# Patient Record
Sex: FEMALE | Race: WHITE | NOT HISPANIC OR LATINO | URBAN - METROPOLITAN AREA
[De-identification: names, ages, dates, MRNs, and addresses within clinical notes are randomized per-mention and may not be internally consistent; named-entity substitution may affect disease eponyms.]

---

## 2024-09-09 ENCOUNTER — EMERGENCY (EMERGENCY)
Facility: HOSPITAL | Age: 44
LOS: 1 days | Discharge: HOME | End: 2024-09-09
Admitting: EMERGENCY MEDICINE
Payer: COMMERCIAL

## 2024-09-09 VITALS
HEIGHT: 64 IN | WEIGHT: 177.03 LBS | OXYGEN SATURATION: 97 % | DIASTOLIC BLOOD PRESSURE: 68 MMHG | RESPIRATION RATE: 16 BRPM | TEMPERATURE: 98 F | SYSTOLIC BLOOD PRESSURE: 102 MMHG | HEART RATE: 69 BPM

## 2024-09-09 PROCEDURE — 99284 EMERGENCY DEPT VISIT MOD MDM: CPT

## 2024-09-09 RX ORDER — RABIES IMMUNE GLOBULIN (HUMAN) 300 [IU]/ML
1600 INJECTION, SOLUTION INFILTRATION; INTRAMUSCULAR ONCE
Refills: 0 | Status: COMPLETED | OUTPATIENT
Start: 2024-09-09 | End: 2024-09-09

## 2024-09-09 RX ORDER — RABIES VIRUS STRAIN PM-1503-3M ANTIGEN (PROPIOLACTONE INACTIVATED) AND WATER 2.5 UNIT
1 KIT INTRAMUSCULAR ONCE
Refills: 0 | Status: COMPLETED | OUTPATIENT
Start: 2024-09-09 | End: 2024-09-09

## 2024-09-09 RX ADMIN — RABIES IMMUNE GLOBULIN (HUMAN) 1600 UNIT(S): 300 INJECTION, SOLUTION INFILTRATION; INTRAMUSCULAR at 17:00

## 2024-09-09 RX ADMIN — RABIES VIRUS STRAIN PM-1503-3M ANTIGEN (PROPIOLACTONE INACTIVATED) AND WATER 1 MILLILITER(S): KIT at 17:00

## 2024-09-09 NOTE — ED PROVIDER NOTE - PATIENT PORTAL LINK FT
You can access the FollowMyHealth Patient Portal offered by [facility] by registering at the following website: [email or web address]. By joining [employer]’s FollowMyHealth portal, you will also be able to view your health information using other applications (apps) compatible with our system.

## 2024-09-09 NOTE — ED PROVIDER NOTE - NSFOLLOWUPINSTRUCTIONS_ED_ALL_ED_FT
DOSES  DAY 0 (TODAY)  DAY 3 (9/12)  DAY 7 (9/16)  DAY 14 (9/23)    1. What is rabies?    Rabies is a serious disease. It is caused by a virus.  Rabies is mainly a disease of animals. Humans get rabies when they are bitten by infected animals.  At first there might not be any symptoms. But weeks, or even months after a bite, rabies can cause pain, fatigue, headaches, fever, and irritability. These are followed by seizures, hallucinations, and paralysis. Human rabies is almost always fatal.  Wild animals—especially bats—are the most common source of human rabies infection in the [place].   Skunks, raccoons, dogs, cats, coyotes, foxes and other mammals can also transmit the disease.  Human rabies is rare in the [place]. There have been only 55 cases diagnosed since 1990.   However, between 16,000 and 39,000 people are vaccinated each year as a precaution after animal bites. Also, rabies is far more common in other parts of the world, with about 40,000–70,000 rabies-related deaths worldwide each year. Bites from unvaccinated dogs cause most of these cases.  Rabies vaccine can prevent rabies.     2. Rabies vaccine  Rabies vaccine is given to people at high risk of rabies to protect them if they are exposed. It can also prevent the disease if it is given to a person after they have been exposed.  Rabies vaccine is made from killed rabies virus. It cannot cause rabies.    3. Who should get rabies vaccine and when?  Preventive vaccination (no exposure)     People at high risk of exposure to rabies, such as veterinarians, animal handlers, rabies laboratory workers, spelunkers, and rabies biologics production workers should be offered rabies vaccine.The vaccine should also be considered for:  People whose activities bring them into frequent contact with rabies virus or with possibly rabid animals.International travelers who are likely to come in contact with animals in parts of the world where rabies is common.The pre-exposure schedule for rabies vaccination is 3 doses, given at the following times:    Dose 1: As appropriateDose 2: 7 days after Dose 1Dose 3: 21 days or 28 days after Dose 1For laboratory workers and others who may be repeatedly exposed to rabies virus, periodic testing for immunity is recommended, and booster doses should be given as needed. (Testing or booster doses are not recommended for travelers.) Ask your doctor for details.    Vaccination after an exposure    Anyone who has been bitten by an animal, or who otherwise may have been exposed to rabies, should clean the wound and see a doctor immediately. The doctor will determine if they need to be vaccinated.  A person who is exposed and has never been vaccinated against rabies should get 4 doses of rabies vaccine—one dose right away and additional doses on the 3rd, 7th, and 14th days. They should also get another shot called Rabies Immune Globulin at the same time as the first dose.  A person who has been previously vaccinated should get 2 doses of rabies vaccine—one right away and another on the 3rd day. Rabies Immune Globulin is not needed.    4. Tell your doctor if...  Talk with a doctor before getting rabies vaccine if you:  ever had a serious (life-threatening) allergic reaction to a previous dose of rabies vaccine, or to any component of the vaccine; tell your doctor if you have any severe allergies,  have a weakened immune system because of:  HIV/AIDS, or another disease that affects the immune system,treatment with drugs that affect the immune system, such as steroids,cancer, or cancer treatment with radiation or drugs.If you have a minor illness, such as a cold, you can be vaccinated. If you are moderately or severely ill, you should probably wait until you recover before getting a routine (non-exposure) dose of rabies vaccine.  If you have been exposed to rabies virus, you should get the vaccine regardless of any other illnesses you may have.    5. What are the risks from rabies vaccine?  A vaccine, like any medicine, is capable of causing serious problems, such as severe allergic reactions. The risk of a vaccine causing serious harm, or death, is extremely small. Serious problems from rabies vaccine are very rare.  Mild problems : Soreness, redness, swelling, or itching where the shot was given (30%–74%)headache, nausea, abdominal pain, muscle aches, dizziness (5%–40%)    Moderate problems : Hives, pain in the joints, fever (about 6% of booster doses)Other nervous system disorders, such as Guillain–Barré syndrome (GBS), have been reported after rabies vaccine, but this happens so rarely that it is not known whether they are related to the vaccine.    NOTE: Several brands of rabies vaccine are available in the [place], and reactions may vary between brands. Your provider can give you more information about a particular brand.    6. What if there is a serious reaction?  What should I look for?     Look for anything that concerns you, such as signs of a severe allergic reaction, very high fever, or behavior changes.Signs of a severe allergic reaction can include hives, swelling of the face and throat, difficulty breathing, a fast heartbeat, dizziness, and weakness. These would start a few minutes to a few hours after the vaccination.    What should I do?   If you think it is a severe allergic reaction or other emergency that can't wait, call 9-1-1 or get the person to the nearest hospital. Otherwise, call your doctor.Afterward, the reaction should be reported to the Vaccine Adverse Event Reporting System (VAERS). Your doctor might file this report, or you can do it yourself through the VAERS web site at www.vaers.hhs.gov, or by calling 1-591.166.5039.

## 2024-09-09 NOTE — ED PROVIDER NOTE - OBJECTIVE STATEMENT
44-year-old otherwise healthy female presents today complaining of a dog bite to her right thigh.  Patient was at the dog park with her own dog when another dog came up and bit her.  The owner was unwilling to talk to her or answer any questions.  She does not know the dog's vaccination status and states that the owner was somewhat aggressive with her.  she was seen at urgent care prior to arrival where she had a tetanus vaccine updated.  The wound was washed with soap water and alcohol.  She was sent a prescription for Augmentin  from urgent care.  She was recommended to come here for rabies vaccination.

## 2024-09-09 NOTE — ED ADULT NURSE NOTE - NSFALLUNIVINTERV_ED_ALL_ED
Bed/Stretcher in lowest position, wheels locked, appropriate side rails in place/Call bell, personal items and telephone in reach/Instruct patient to call for assistance before getting out of bed/chair/stretcher/Non-slip footwear applied when patient is off stretcher/[place] to call system/Physically safe environment - no spills, clutter or unnecessary equipment/Purposeful proactive rounding/Room/bathroom lighting operational, light cord in reach

## 2024-09-09 NOTE — ED PROVIDER NOTE - PHYSICAL EXAMINATION
Constitutional: Well appearing, awake, alert, oriented to person, place, time/situation and in no apparent distress.  HEENT: Airway patent, NCAT  Resp: no conversational dyspnea, tachypnea, or signs of respiratory distress  Msk: ambulating with normal steady gait  Neuro: A&Ox3  Skin: right thigh with 2-3 areas of bruising and superficial skin tear without deep space involvement or puncture wound, no palpable foreign body. no surrounding hematoma

## 2024-09-09 NOTE — ED ADULT NURSE NOTE - OBJECTIVE STATEMENT
"Dog bite to right thigh today. Unsure of dog vaccination status"    pt stable, not in any acute distress, care ongoing.

## 2024-09-09 NOTE — ED PROVIDER NOTE - CLINICAL SUMMARY MEDICAL DECISION MAKING FREE TEXT BOX
Patient presents today with a dog bite.  She has a small superficial skin tear and 2-3 areas of bruising consistent with dog bite.  Patient has prescription for Augmentin and wound has been washed with soap water and alcohol.  Not concerned for foreign body based on exam and superficial nature of the wound.  the dog owner was not forthcoming with any information about the dog or himself.  Rabies prophylaxis ordered.  Dog bite report filled out for the [facility].

## 2024-09-13 DIAGNOSIS — Z20.3 CONTACT WITH AND (SUSPECTED) EXPOSURE TO RABIES: ICD-10-CM

## 2024-09-13 DIAGNOSIS — S70.11XA CONTUSION OF RIGHT THIGH, INITIAL ENCOUNTER: ICD-10-CM
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
## 2024-09-13 DIAGNOSIS — Z23 ENCOUNTER FOR IMMUNIZATION: ICD-10-CM
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
## 2024-12-31 NOTE — ASU PATIENT PROFILE, ADULT - NSICDXPASTMEDICALHX_GEN_ALL_CORE_FT
PAST MEDICAL HISTORY:  No pertinent past medical history      PAST MEDICAL HISTORY:  Heart burn

## 2024-12-31 NOTE — ASU PATIENT PROFILE, ADULT - NS PREOP UNDERSTANDS INFO
no solids/chewing gum/candy after MN, water/clear apple juice 3 hours before the procedure, after that NPO, bring photo ID, insurance card, escort needs to have photo ID, no jewelry , no makeup, no contact lenses

## 2025-01-02 ENCOUNTER — OUTPATIENT (OUTPATIENT)
Dept: OUTPATIENT SERVICES | Facility: HOSPITAL | Age: 45
LOS: 1 days | Discharge: HOME | End: 2025-01-02
Payer: COMMERCIAL

## 2025-01-02 VITALS
SYSTOLIC BLOOD PRESSURE: 100 MMHG | HEART RATE: 61 BPM | OXYGEN SATURATION: 97 % | DIASTOLIC BLOOD PRESSURE: 58 MMHG | WEIGHT: 182.32 LBS | TEMPERATURE: 98 F | HEIGHT: 64 IN | RESPIRATION RATE: 16 BRPM

## 2025-01-02 VITALS
DIASTOLIC BLOOD PRESSURE: 65 MMHG | HEART RATE: 65 BPM | OXYGEN SATURATION: 98 % | RESPIRATION RATE: 16 BRPM | SYSTOLIC BLOOD PRESSURE: 112 MMHG

## 2025-01-02 DIAGNOSIS — Z98.890 OTHER SPECIFIED POSTPROCEDURAL STATES: Chronic | ICD-10-CM

## 2025-01-02 PROCEDURE — 88305 TISSUE EXAM BY PATHOLOGIST: CPT | Mod: 26

## 2025-01-02 PROCEDURE — 88311 DECALCIFY TISSUE: CPT | Mod: 26

## 2025-01-02 DEVICE — SPLINT INTRANASAL POSISEP X 0.6X2IN
Type: IMPLANTABLE DEVICE | Status: NON-FUNCTIONAL
Removed: 2025-01-02

## 2025-01-02 DEVICE — SYS CATH BALLOON ACCLARENT EUSTACHIAN 6X16MM
Type: IMPLANTABLE DEVICE | Status: NON-FUNCTIONAL
Removed: 2025-01-02

## 2025-01-02 RX ORDER — ACETAMINOPHEN 80 MG/.8ML
650 SOLUTION/ DROPS ORAL ONCE
Refills: 0 | Status: DISCONTINUED | OUTPATIENT
Start: 2025-01-02 | End: 2025-01-02

## 2025-01-02 RX ORDER — SODIUM CHLORIDE 9 MG/ML
1000 INJECTION, SOLUTION INTRAVENOUS
Refills: 0 | Status: DISCONTINUED | OUTPATIENT
Start: 2025-01-02 | End: 2025-01-02

## 2025-01-02 RX ORDER — FENTANYL 75 UG/H
50 PATCH, EXTENDED RELEASE TRANSDERMAL ONCE
Refills: 0 | Status: DISCONTINUED | OUTPATIENT
Start: 2025-01-02 | End: 2025-01-02

## 2025-01-02 RX ORDER — ONDANSETRON 4 MG/1
4 TABLET ORAL ONCE
Refills: 0 | Status: DISCONTINUED | OUTPATIENT
Start: 2025-01-02 | End: 2025-01-02

## 2025-01-02 RX ORDER — OXYCODONE HCL 15 MG
5 TABLET ORAL ONCE
Refills: 0 | Status: DISCONTINUED | OUTPATIENT
Start: 2025-01-02 | End: 2025-01-02

## 2025-01-02 NOTE — ASU DISCHARGE PLAN (ADULT/PEDIATRIC) - FINANCIAL ASSISTANCE
[facility] provides services at a reduced cost to those who are determined to be eligible through [facility]’s financial assistance program. Information regarding [facility]’s financial assistance program can be found by going to [email or web address] or by calling [phone].

## 2025-01-02 NOTE — BRIEF OPERATIVE NOTE - NSICDXBRIEFPREOP_GEN_ALL_CORE_FT
PRE-OP DIAGNOSIS:  Chronic pansinusitis 02-Jan-2025 07:41:07  Milagros Peña  Nasal turbinate hypertrophy 02-Jan-2025 07:42:12  Milagros Peña

## 2025-01-02 NOTE — BRIEF OPERATIVE NOTE - NSICDXBRIEFPROCEDURE_GEN_ALL_CORE_FT
PROCEDURES:  Polypectomy, maxillary sinus 02-Jan-2025 07:39:04  Milagros Peña  Turbinate resection 02-Jan-2025 07:39:30  Milagros Peña  Endoscopic balloon dilation of both eustachian tubes 02-Jan-2025 07:39:54  Milagros Peña  Radiofrequency ablation, nerve, nasal cavity, posterior, endoscopic 02-Jan-2025 07:40:36  Milagros Peña

## 2025-01-02 NOTE — BRIEF OPERATIVE NOTE - NSICDXBRIEFPOSTOP_GEN_ALL_CORE_FT
POST-OP DIAGNOSIS:  Hypertrophy of nasal turbinates 02-Jan-2025 07:42:41  Milagros Peña  Chronic pansinusitis 02-Jan-2025 07:43:07  Milagros Peña
